# Patient Record
Sex: FEMALE | Race: BLACK OR AFRICAN AMERICAN | Employment: UNEMPLOYED | URBAN - METROPOLITAN AREA
[De-identification: names, ages, dates, MRNs, and addresses within clinical notes are randomized per-mention and may not be internally consistent; named-entity substitution may affect disease eponyms.]

---
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
## 2020-02-08 ENCOUNTER — HOSPITAL ENCOUNTER (EMERGENCY)
Age: 20
Discharge: HOME | End: 2020-02-08
Attending: EMERGENCY MEDICINE
Payer: SELF-PAY

## 2020-02-08 VITALS
RESPIRATION RATE: 18 BRPM | DIASTOLIC BLOOD PRESSURE: 89 MMHG | TEMPERATURE: 98.2 F | WEIGHT: 131 LBS | SYSTOLIC BLOOD PRESSURE: 131 MMHG | OXYGEN SATURATION: 99 % | HEART RATE: 85 BPM

## 2020-02-08 DIAGNOSIS — R68.89 FLU-LIKE SYMPTOMS: Primary | ICD-10-CM

## 2020-02-08 PROCEDURE — 99282 EMERGENCY DEPT VISIT SF MDM: CPT

## 2020-02-08 RX ORDER — CETIRIZINE HYDROCHLORIDE, PSEUDOEPHEDRINE HYDROCHLORIDE 5; 120 MG/1; MG/1
1 TABLET, FILM COATED, EXTENDED RELEASE ORAL 2 TIMES DAILY
Qty: 14 TAB | Refills: 0 | Status: SHIPPED | OUTPATIENT
Start: 2020-02-08

## 2020-02-08 RX ORDER — OSELTAMIVIR PHOSPHATE 75 MG/1
75 CAPSULE ORAL 2 TIMES DAILY
Qty: 10 CAP | Refills: 0 | Status: SHIPPED | OUTPATIENT
Start: 2020-02-08 | End: 2020-02-13

## 2020-02-08 NOTE — ED PROVIDER NOTES
EMERGENCY DEPARTMENT HISTORY AND PHYSICAL EXAM    Date: 2/8/2020  Patient Name: Suellen Jones    History of Presenting Illness     Chief Complaint   Patient presents with    Cough    Sore Throat         History Provided By: Patient    HPI: Suellen Jones is a 23 y.o. female with a PMH of No significant past medical history who presents with cough and sore throat. Onset 2 days ago. Patient reports runny nose, watery eyes. Denies fever, chills or body aches. Patient is unsure if she has a flu due to positive exposure by her cousin. Patient states she has not taken anything for her symptoms. Reports a lot of drainage in her throat is causing it to scratch. PCP: Megan Webber MD    Current Outpatient Medications   Medication Sig Dispense Refill    oseltamivir (TAMIFLU) 75 mg capsule Take 1 Cap by mouth two (2) times a day for 5 days. 10 Cap 0    cetirizine-pseudoePHEDrine (ZYRTEC-D) 5-120 mg Tb12 Take 1 Tab by mouth two (2) times a day. 14 Tab 0       Past History     Past Medical History:  History reviewed. No pertinent past medical history. Past Surgical History:  History reviewed. No pertinent surgical history. Family History:  History reviewed. No pertinent family history. Social History:  Social History     Tobacco Use    Smoking status: Current Some Day Smoker    Smokeless tobacco: Never Used   Substance Use Topics    Alcohol use: No    Drug use: No       Allergies:  No Known Allergies      Review of Systems   Review of Systems   Constitutional: Negative for chills, fatigue and fever. HENT: Positive for congestion, postnasal drip, rhinorrhea and sore throat. Negative for ear discharge, ear pain, sinus pain and sneezing. Eyes: Negative for pain. Respiratory: Negative for cough. Cardiovascular: Negative for chest pain. Gastrointestinal: Negative for abdominal pain, nausea and vomiting. Musculoskeletal: Negative for arthralgias. Skin: Negative for rash.    Neurological: Negative for headaches. All other systems reviewed and are negative. Physical Exam     Vitals:    02/08/20 0904   BP: 131/89   Pulse: 85   Resp: 18   Temp: 98.2 °F (36.8 °C)   SpO2: 99%   Weight: 59.4 kg (131 lb)     Physical Exam  Vitals signs and nursing note reviewed. Constitutional:       General: She is not in acute distress. Appearance: She is well-developed. HENT:      Head: Normocephalic and atraumatic. Right Ear: Tympanic membrane and ear canal normal.      Left Ear: Tympanic membrane normal.      Nose: Congestion present. Right Turbinates: Enlarged. Right Sinus: No maxillary sinus tenderness or frontal sinus tenderness. Left Sinus: No maxillary sinus tenderness or frontal sinus tenderness. Mouth/Throat:      Mouth: Mucous membranes are moist.      Pharynx: Oropharynx is clear. No pharyngeal swelling, oropharyngeal exudate or posterior oropharyngeal erythema. Eyes:      Conjunctiva/sclera: Conjunctivae normal.      Pupils: Pupils are equal, round, and reactive to light. Neck:      Musculoskeletal: Normal range of motion and neck supple. Cardiovascular:      Rate and Rhythm: Normal rate and regular rhythm. Heart sounds: Normal heart sounds. Pulmonary:      Effort: Pulmonary effort is normal.      Breath sounds: Normal breath sounds. Musculoskeletal: Normal range of motion. Skin:     General: Skin is warm and dry. Neurological:      Mental Status: She is alert and oriented to person, place, and time. Diagnostic Study Results     Labs -   No results found for this or any previous visit (from the past 12 hour(s)). Radiologic Studies -   No orders to display     CT Results  (Last 48 hours)    None        CXR Results  (Last 48 hours)    None            Medical Decision Making   I am the first provider for this patient.     I reviewed the vital signs, available nursing notes, past medical history, past surgical history, family history and social history. Vital Signs-Reviewed the patient's vital signs. Records Reviewed: Nursing Notes and Old Medical Records            Disposition:  Discharge     DISCHARGE NOTE:   10:44 AM      Care plan outlined and precautions discussed. Patient has no new complaints, changes, or physical findings. All medications were reviewed with the patient; will d/c home with tamiflu and zyrtec-d . All of pt's questions and concerns were addressed. Patient was instructed and agrees to follow up with PCp, as well as to return to the ED upon further deterioration. Patient is ready to go home. Follow-up Information     Follow up With Specialties Details Why Contact Info    Jodi Euceda MD Pediatrics In 1 week If symptoms worsen [address] [place] [address]  [phone]            Current Discharge Medication List      START taking these medications    Details   oseltamivir (TAMIFLU) 75 mg capsule Take 1 Cap by mouth two (2) times a day for 5 days. Qty: 10 Cap, Refills: 0      cetirizine-pseudoePHEDrine (ZYRTEC-D) 5-120 mg Tb12 Take 1 Tab by mouth two (2) times a day. Qty: 14 Tab, Refills: 0         STOP taking these medications       hydrocortisone (HYCORT) 1 % ointment Comments:   Reason for Stopping:               Provider Notes (Medical Decision Making):   DDX: Influenza, URI, sinusitis, postnasal drip, seasonal allergies    Procedures:  Procedures    Please note that this dictation was completed with Dragon, computer voice recognition software. Quite often unanticipated grammatical, syntax, homophones, and other interpretive errors are inadvertently transcribed by the computer software. Please disregard these errors. Additionally, please excuse any errors that have escaped final proofreading. Diagnosis     Clinical Impression:   1.  Flu-like symptoms

## 2020-02-08 NOTE — DISCHARGE INSTRUCTIONS
Upper Respiratory Infection: After Your Child's Visit to the Emergency Room  Your Care Instructions  Your child was seen in the emergency room for an upper respiratory infection, or URI. Most URIs are caused by a virus, such as the common cold, flu, or sinus infection. Antibiotics will not cure a virus, but there are things you can do at home to help your child feel better. With most URIs, your child should feel better in 4 to 10 days. Even though your child has been released from the emergency room, you still need to watch for any problems. The doctor carefully checked your child. But sometimes problems can develop later. If your child has new symptoms, or if your child's symptoms do not get better, return to the emergency room or call your doctor right away. A visit to the emergency room is only one step in your child's treatment. Even if your child feels better, you still need to do what your doctor recommends, such as going to all suggested follow-up appointments and giving medicines exactly as directed. This will help your child recover and help prevent future problems. How can you care for your child at home? · Give acetaminophen (Tylenol) or ibuprofen (Advil, Motrin) for fever, pain, or fussiness. ? Read and follow all instructions on the label. ? Do not give aspirin to anyone younger than 20. It has been linked to Reye syndrome, a serious illness. ? Be careful when giving your child over-the-counter cold or flu medicines and Tylenol at the same time. Many of these medicines have acetaminophen, which is Tylenol. Read the labels to make sure that you are not giving your child more than the recommended dose. Too much acetaminophen (Tylenol) can be harmful. · Before you give cough and cold medicines to a child, check the label. These medicines may not be safe for young children. · Make sure your child rests. Keep your child home as long as he or she has a fever.   · Place a humidifier by your childs bed or close to your child. This may make it easier for your child to breathe. Follow the directions for cleaning the machine. · Keep your child away from smoke. Do not smoke or let anyone else smoke around your child or in your house. · Teach your child to wash his or her hands several times a day, and consider using hand gels or wipes that contain alcohol. · If the doctor prescribed antibiotics for your child, give them as directed. Do not stop using them just because your child feels better. Your child needs to take the full course of antibiotics. When should you call for help? Call 911 if:  · Your child has severe trouble breathing. Signs may include the chest sinking in, using belly muscles to breathe, or nostrils flaring while your child is struggling to breathe. Return to the emergency room now if:  · Your child has a fever with a stiff neck or a severe headache. · Your child becomes dehydrated (his or her body does not have enough water). If he or she is dehydrated, the skin may be cold and clammy or hot and dry. He or she may have a weak, quick pulse and may also feel confused, anxious, very sleepy, or like he or she may faint. · Your child seems confused or is very hard to wake up. Call your doctor today if:  · Your child cannot keep down medicine or liquids. · Your child has new symptoms, such as a rash, earache, or sore throat. · Your child has a fever for more than 3 days or is not getting better after 5 days. Where can you learn more? Go to Pikhub.be  Enter H597 in the search box to learn more about \"Upper Respiratory Infection: After Your Child's Visit to the Emergency Room. \"   © 7547-2477 [employer]. Care instructions adapted under license by [facility] [employer] (which disclaims liability or warranty for this information).  This care instruction is for use with your licensed healthcare professional. If you have questions about a medical condition or this instruction, always ask your healthcare professional. [place] [address] any warranty or liability for your use of this information. Content Version: 9.4.00816;  Last Revised: August 23, 2010

## 2020-02-08 NOTE — ED NOTES
Patient here with c/o cough and runny nose. Patient states recent exposure to someone with the flu, states \"I wanted to make sure it was just a cold and nothing else. \"  Patient denies fevers, denies N/V/D. Emergency Department Nursing Plan of Care       The Nursing Plan of Care is developed from the Nursing assessment and Emergency Department Attending provider initial evaluation. The plan of care may be reviewed in the ED Provider note.     The Plan of Care was developed with the following considerations:   Patient / Family readiness to learn indicated by:verbalized understanding  Persons(s) to be included in education: patient  Barriers to Learning/Limitations:No    Signed     Etelvina Villafana RN    2/8/2020   10:36 AM

## 2020-02-08 NOTE — LETTER
[facility] EMERGENCY DEPT 
[address] [facility] [place] [id number] 
[phone] Work/School Note Date: 2/8/2020 To Whom It May concern: 
 
Brad Rios was seen and treated today in the emergency room by the following provider(s): 
Attending Provider: Ifrah Gutierres MD 
Nurse Practitioner: Rosaline Theodore NP. Brad Rios may return to work on 2/10/20. Sincerely, Deepa Boudreaux NP